# Patient Record
Sex: FEMALE | URBAN - NONMETROPOLITAN AREA
[De-identification: names, ages, dates, MRNs, and addresses within clinical notes are randomized per-mention and may not be internally consistent; named-entity substitution may affect disease eponyms.]

---

## 2020-11-04 ENCOUNTER — APPOINTMENT (RX ONLY)
Dept: URBAN - NONMETROPOLITAN AREA CLINIC 8 | Facility: CLINIC | Age: 50
Setting detail: DERMATOLOGY
End: 2020-11-04

## 2020-11-04 DIAGNOSIS — L82.1 OTHER SEBORRHEIC KERATOSIS: ICD-10-CM

## 2020-11-04 PROBLEM — D48.5 NEOPLASM OF UNCERTAIN BEHAVIOR OF SKIN: Status: ACTIVE | Noted: 2020-11-04

## 2020-11-04 PROCEDURE — 99202 OFFICE O/P NEW SF 15 MIN: CPT | Mod: 25

## 2020-11-04 PROCEDURE — ? BIOPSY BY PUNCH METHOD

## 2020-11-04 PROCEDURE — ? OBSERVATION AND MEASURE

## 2020-11-04 PROCEDURE — ? COUNSELING

## 2020-11-04 PROCEDURE — 11104 PUNCH BX SKIN SINGLE LESION: CPT

## 2020-11-04 ASSESSMENT — LOCATION SIMPLE DESCRIPTION DERM: LOCATION SIMPLE: POSTERIOR SCALP

## 2020-11-04 ASSESSMENT — LOCATION DETAILED DESCRIPTION DERM: LOCATION DETAILED: LEFT SUPERIOR OCCIPITAL SCALP

## 2020-11-04 ASSESSMENT — LOCATION ZONE DERM: LOCATION ZONE: SCALP

## 2020-11-04 NOTE — PROCEDURE: OBSERVATION
Size Of Lesion: 6 mm
Detail Level: Detailed
Body Location Override (Optional - Billing Will Still Be Based On Selected Body Map Location If Applicable): left posterior scalp

## 2020-11-04 NOTE — PROCEDURE: BIOPSY BY PUNCH METHOD
Size Of Lesion In Cm (Optional): 0
Punch Size In Mm: 8
Detail Level: Detailed
Body Location Override (Optional - Billing Will Still Be Based On Selected Body Map Location If Applicable): left medial knee
Hemostasis: None
Validate Triangulation: No
Render Post-Care Instructions In Note?: yes
Billing Type: Third-Party Bill
Notification Instructions: Patient will be notified of biopsy results. However, patient instructed to call the office if not contacted within 2 weeks.
Dressing: bandage
Anesthesia Volume In Cc: 0.5
Home Suture Removal Text: Patient was provided a home suture removal kit and will remove their sutures at home.  If they have any questions or difficulties they will call the office.
Consent: Written consent was obtained and risks were reviewed including but not limited to scarring, infection, bleeding, scabbing, incomplete removal, nerve damage and allergy to anesthesia.
Epidermal Sutures: 4-0 Ethilon
Biopsy Type: H and E
Anesthesia Type: 1% lidocaine with epinephrine
Post-Care Instructions: I reviewed with the patient in detail post-care instructions. The bandage can be removed the next day and a small layer of Vaseline should be applied.  If the wound is oozing, a small dressing or band-aid can be reapplied.  Watch for signs of infection, i.e., yellow drainage, extreme redness, or hot to the touch.  Call the office if infection is suspected.
Wound Care: Vaseline
Suture Removal: 7 days
Information: Selecting Yes will display possible errors in your note based on the variables you have selected. This validation is only offered as a suggestion for you. PLEASE NOTE THAT THE VALIDATION TEXT WILL BE REMOVED WHEN YOU FINALIZE YOUR NOTE. IF YOU WANT TO FAX A PRELIMINARY NOTE YOU WILL NEED TO TOGGLE THIS TO 'NO' IF YOU DO NOT WANT IT IN YOUR FAXED NOTE.

## 2020-11-04 NOTE — HPI: EVALUATION OF SKIN LESION(S)
What Type Of Note Output Would You Prefer (Optional)?: Standard Output
Hpi Title: Evaluation of a Skin Lesion
How Severe Are Your Spot(S)?: moderate
Have Your Spot(S) Been Treated In The Past?: has not been treated
Year Removed: 1900